# Patient Record
Sex: MALE | URBAN - METROPOLITAN AREA
[De-identification: names, ages, dates, MRNs, and addresses within clinical notes are randomized per-mention and may not be internally consistent; named-entity substitution may affect disease eponyms.]

---

## 2018-07-09 ENCOUNTER — OFFICE VISIT - HEALTHEAST (OUTPATIENT)
Dept: FAMILY MEDICINE | Facility: CLINIC | Age: 48
End: 2018-07-09

## 2018-07-09 ENCOUNTER — AMBULATORY - HEALTHEAST (OUTPATIENT)
Dept: FAMILY MEDICINE | Facility: CLINIC | Age: 48
End: 2018-07-09

## 2018-07-09 DIAGNOSIS — H61.23 BILATERAL IMPACTED CERUMEN: ICD-10-CM

## 2018-07-09 DIAGNOSIS — R03.0 ELEVATED BLOOD PRESSURE READING WITHOUT DIAGNOSIS OF HYPERTENSION: ICD-10-CM

## 2018-07-09 ASSESSMENT — MIFFLIN-ST. JEOR: SCORE: 1521.64

## 2021-06-01 VITALS — BODY MASS INDEX: 27.06 KG/M2 | WEIGHT: 162.4 LBS | HEIGHT: 65 IN

## 2021-06-16 PROBLEM — R03.0 ELEVATED BLOOD PRESSURE READING WITHOUT DIAGNOSIS OF HYPERTENSION: Status: ACTIVE | Noted: 2018-07-09

## 2021-06-19 NOTE — PROGRESS NOTES
"Assessment/Plan:     1. Bilateral impacted cerumen  Lateral ear canals irrigated per provider assistant.  Cerumen was completely removed, and patient tolerated well.  I suspect this is the cause of his symptoms.  He will certainly follow-up if he has any continued tinnitus.    2. Elevated blood pressure reading without diagnosis of hypertension  Blood pressure is elevated today.  We do not have a good history of values, as patient is new to our clinic.  He does have a positive family history of hypertension.  Discussed the DASH diet, and reducing sodium.  Follow-up appointment made in 1 month for blood pressure check.  If still elevated, will recommend medication at that time.        Subjective:     Omari Hurley is a 47 y.o. male who presents with right-sided ear ringing and decreased hearing.  Symptoms started somewhat suddenly about 3 days ago.  Denies any pain or drainage.  States the ear feels \"clogged\".  Patient works at [employer].  He does not regularly use ear protection.  Patient usually cleaned his ears out with Q-tips.  Denies any headache, dizziness, or change in vision.    Of note, patient's blood pressure is elevated today.  No history of hypertension.  States there is a significant family history of high blood pressure.  Patient denies any chest pain, shortness of breath, headache, dizziness, or lower extremity edema.      The following portions of the patient's history were reviewed and updated as appropriate: allergies, current medications, past family history, past medical history, past social history, past surgical history and problem list.    Review of Systems  A comprehensive review of systems was performed and was otherwise negative    Objective:     BP (!) 144/106 (Patient Site: Left Arm, Patient Position: Sitting)  Pulse 88  Ht 5' 4.57\" (1.64 m)  Wt 162 lb 6.4 oz (73.7 kg)  BMI 27.39 kg/m2    General Appearance: Alert, cooperative, no distress, appears stated age  Ears: Unable to " visualize bilateral TMs secondary to cerumen.   Post irrigation: bilateral TMs normal. Slight erythema to right canal.   Lungs: Clear to auscultation bilaterally, respirations unlabored  Heart: Regular rate and rhythm, S1 and S2 normal, no murmur, rub, or gallop    Lesia Mauricio, NP-C